# Patient Record
Sex: MALE | Race: BLACK OR AFRICAN AMERICAN | NOT HISPANIC OR LATINO
[De-identification: names, ages, dates, MRNs, and addresses within clinical notes are randomized per-mention and may not be internally consistent; named-entity substitution may affect disease eponyms.]

---
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
## 2023-01-17 ENCOUNTER — RX RENEWAL (OUTPATIENT)
Age: 18
End: 2023-01-17

## 2023-02-03 ENCOUNTER — RX RENEWAL (OUTPATIENT)
Age: 18
End: 2023-02-03

## 2023-03-06 ENCOUNTER — RX RENEWAL (OUTPATIENT)
Age: 18
End: 2023-03-06

## 2023-04-06 ENCOUNTER — OUTPATIENT (OUTPATIENT)
Dept: OUTPATIENT SERVICES | Facility: HOSPITAL | Age: 18
LOS: 1 days | End: 2023-04-06
Payer: MEDICAID

## 2023-04-06 ENCOUNTER — APPOINTMENT (OUTPATIENT)
Dept: NEUROLOGY | Facility: HOSPITAL | Age: 18
End: 2023-04-06
Payer: MEDICAID

## 2023-04-06 VITALS
RESPIRATION RATE: 14 BRPM | WEIGHT: 210 LBS | SYSTOLIC BLOOD PRESSURE: 127 MMHG | HEIGHT: 69 IN | BODY MASS INDEX: 31.1 KG/M2 | TEMPERATURE: 98 F | HEART RATE: 76 BPM | DIASTOLIC BLOOD PRESSURE: 87 MMHG

## 2023-04-06 DIAGNOSIS — R56.9 UNSPECIFIED CONVULSIONS: ICD-10-CM

## 2023-04-06 DIAGNOSIS — G40.909 EPILEPSY, UNSPECIFIED, NOT INTRACTABLE, WITHOUT STATUS EPILEPTICUS: ICD-10-CM

## 2023-04-06 PROCEDURE — G0463: CPT

## 2023-04-06 PROCEDURE — 99212 OFFICE O/P EST SF 10 MIN: CPT

## 2023-04-06 NOTE — HISTORY OF PRESENT ILLNESS
[FreeTextEntry1] : Current Medications:\par LAC 200mg BID\par VPA 750mg BID (Level of 57 in 3/22)\par XCopri 200mg QD\par \par Follow-up 4/6/23\par Patient returns for follow-up accompanied by grandmother. Has been doing well. Noted that at times if the patient doesn't want to do something, he may stamp his foot but grandmother denied that the patient has had any agitation, anger. Has been doing well in school and is noted to be loved by his classmates. Has been sleeping and eating well. No rashes, skin changes. No falls, instability. Occasionally has had mild tremors when performing fine finger movements such as zipping up his coat but is still able to perform the task. No missed doses. No seizures since 2021. \par \par Follow-up 11/17/22\par Patient returns for follow-up accompanied by his mother. Mother stated that the patient has been doing well. Has been doing well, no mood changes, agitation, other behavioral changes, lethargy, dizziness, gait imbalance, sleep changes. Has been sleeping and eating well. No rashes or other skin changes. Goes to school and has been doing well as per mother. Takes medications as prescribed without missed doses, no reported side effects. No seizures in several years. \par \par **6/30/22**\par Here for follow up. no seizures since the last visit.  Goes to school, doing well from behavioral perspective. No dizziness, gait imbalance. Overall doing well.\par \par Follow up visit on 4/7/22\par \par 16 yo male born FT, nonverbal, with history of focal epilepsy (diagnosed at 5 months of age) and intellectual disability secondary to KCNA2 mutation who presents for follow up regarding focal epilepsy. Patient recently underwent EMU stay in 3/22 that demonstrated BL frontotemporal sharps on EEG. Patient ultimately transitioned from OXC to LAC and began Xcopri titration pack. At present patient is on his last dose of 150mg Xcopri and will be increasing to 200mg qd. Patient has not suffered any seizures since his EMU stay and appears to be tolerating his medications well. Today he appears to be in good spirits. Patients grandmother does not appear to have noticed any signs concerning for somnolence, dizziness, falls, newfound gait instability, tremor.\par \par Seizure semiology: eyes twitching and behavioral arrest (CLARA) with or without generalized tonic clonic episode. Also noted to have stiffening episodes in the past. Was seizure free from ages 5 - 15 and then had seizure in June 2020 that resulted in resuming VPA. \par \par Social Hx: \par In 12th grade at [address] [place] [address], currently in remote schooling. (Previously 12:1 class with para). OT/PT/ST at home (2x/2x/3x) now. Will continue with the same high school until 19 years of age, with plans to go to day program afterwards (unsure of independence for a job, but enjoys meeting people and being social). \par \par Seizure History: \par REEG/AEEG ordered last visit- not done; last vEEG showed bilateral occipital spikes with left sided electrographic sz. \par MRI in 2018 attempted but discontinued due to coughing, attributed to reaction to medication (unclear if related to sedation or IV contrast). 11/2007 MRI was unremarkable\par Last levels- August 2019, normal\par \par AED History: \par Phenobarbital - weaned off 2/2016\par VPA- Weaned him off in Nov-December 2019, resumed June 2020.

## 2023-04-06 NOTE — ASSESSMENT
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 Last seizure June 2020, no recurrence since, recently underwent EMU stay in 3/2022 that ultimately transitioned from OXC to LAC and began Xcopri titration pack, now on xcopri 200 mg. Neurologic examination stable. Seizures are stable\par \par Tolerating ASMs well, family denies patient is having side effects.\par \par Plan\par - Continue Xcopri 200mg qd, VPA 750mg BID and LAC 200mg BID\par - If patient develops side effects or longstanding sz control over time, will consider titrating down on LAC in the future as would have sodium channel coverage from Xcopri.\par - Check EKG at next visit, most recent EKG in Mar '22, , QTc 412\par - last VPA level 77 in Nov '22, normal platelets and LFTs\par - continue Vit D supplementation\par - Discussed basic seizure precautions\par - RTC in 6 months\par \par Patient discussed with neurology attending, Dr. Burkett.

## 2023-05-30 ENCOUNTER — RX RENEWAL (OUTPATIENT)
Age: 18
End: 2023-05-30

## 2023-05-31 ENCOUNTER — RX RENEWAL (OUTPATIENT)
Age: 18
End: 2023-05-31

## 2023-06-16 ENCOUNTER — RX RENEWAL (OUTPATIENT)
Age: 18
End: 2023-06-16

## 2023-07-31 ENCOUNTER — RX RENEWAL (OUTPATIENT)
Age: 18
End: 2023-07-31

## 2023-08-17 ENCOUNTER — OUTPATIENT (OUTPATIENT)
Dept: OUTPATIENT SERVICES | Facility: HOSPITAL | Age: 18
LOS: 1 days | End: 2023-08-17
Payer: MEDICAID

## 2023-08-17 ENCOUNTER — APPOINTMENT (OUTPATIENT)
Dept: NEUROLOGY | Facility: HOSPITAL | Age: 18
End: 2023-08-17
Payer: MEDICAID

## 2023-08-17 VITALS
DIASTOLIC BLOOD PRESSURE: 76 MMHG | HEART RATE: 84 BPM | SYSTOLIC BLOOD PRESSURE: 129 MMHG | HEIGHT: 69 IN | TEMPERATURE: 98 F | BODY MASS INDEX: 31.25 KG/M2 | OXYGEN SATURATION: 98 % | RESPIRATION RATE: 14 BRPM | WEIGHT: 211 LBS

## 2023-08-17 DIAGNOSIS — F79 UNSPECIFIED INTELLECTUAL DISABILITIES: ICD-10-CM

## 2023-08-17 DIAGNOSIS — G40.909 EPILEPSY, UNSPECIFIED, NOT INTRACTABLE, W/OUT STATUS EPILEPTICUS: ICD-10-CM

## 2023-08-17 DIAGNOSIS — R89.8 OTHER ABNORMAL FINDINGS IN SPECIMENS FROM OTHER ORGANS, SYSTEMS AND TISSUES: ICD-10-CM

## 2023-08-17 DIAGNOSIS — R56.9 UNSPECIFIED CONVULSIONS: ICD-10-CM

## 2023-08-17 LAB
24R-OH-CALCIDIOL SERPL-MCNC: 37.4 NG/ML — SIGNIFICANT CHANGE UP (ref 30–80)
ALBUMIN SERPL ELPH-MCNC: 5.1 G/DL — HIGH (ref 3.3–5)
ALP SERPL-CCNC: 87 U/L — SIGNIFICANT CHANGE UP (ref 40–120)
ALT FLD-CCNC: 38 U/L — SIGNIFICANT CHANGE UP (ref 10–45)
ANION GAP SERPL CALC-SCNC: 14 MMOL/L — SIGNIFICANT CHANGE UP (ref 5–17)
AST SERPL-CCNC: 20 U/L — SIGNIFICANT CHANGE UP (ref 10–40)
BILIRUB SERPL-MCNC: 0.2 MG/DL — SIGNIFICANT CHANGE UP (ref 0.2–1.2)
BUN SERPL-MCNC: 16 MG/DL — SIGNIFICANT CHANGE UP (ref 7–23)
CALCIUM SERPL-MCNC: 10 MG/DL — SIGNIFICANT CHANGE UP (ref 8.4–10.5)
CHLORIDE SERPL-SCNC: 102 MMOL/L — SIGNIFICANT CHANGE UP (ref 96–108)
CO2 SERPL-SCNC: 25 MMOL/L — SIGNIFICANT CHANGE UP (ref 22–31)
CREAT SERPL-MCNC: 0.82 MG/DL — SIGNIFICANT CHANGE UP (ref 0.5–1.3)
EGFR: 129 ML/MIN/1.73M2 — SIGNIFICANT CHANGE UP
GLUCOSE SERPL-MCNC: 88 MG/DL — SIGNIFICANT CHANGE UP (ref 70–99)
POTASSIUM SERPL-MCNC: 4.3 MMOL/L — SIGNIFICANT CHANGE UP (ref 3.5–5.3)
POTASSIUM SERPL-SCNC: 4.3 MMOL/L — SIGNIFICANT CHANGE UP (ref 3.5–5.3)
PROT SERPL-MCNC: 8.3 G/DL — SIGNIFICANT CHANGE UP (ref 6–8.3)
SODIUM SERPL-SCNC: 141 MMOL/L — SIGNIFICANT CHANGE UP (ref 135–145)
VALPROATE SERPL-MCNC: 69 UG/ML — SIGNIFICANT CHANGE UP (ref 50–100)

## 2023-08-17 PROCEDURE — 36415 COLL VENOUS BLD VENIPUNCTURE: CPT

## 2023-08-17 PROCEDURE — G0463: CPT

## 2023-08-17 PROCEDURE — 99214 OFFICE O/P EST MOD 30 MIN: CPT

## 2023-08-17 PROCEDURE — 82306 VITAMIN D 25 HYDROXY: CPT

## 2023-08-17 PROCEDURE — 80164 ASSAY DIPROPYLACETIC ACD TOT: CPT

## 2023-08-17 PROCEDURE — 85025 COMPLETE CBC W/AUTO DIFF WBC: CPT

## 2023-08-17 PROCEDURE — 80053 COMPREHEN METABOLIC PANEL: CPT

## 2023-08-17 NOTE — DISCUSSION/SUMMARY
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 Last seizure June 2020, no recurrence since, recently underwent EMU stay in 3/2022 that ultimately transitioned from OXC to LAC and began Xcopri titration pack, now on xcopri 200 mg. Neurologic examination stable. Seizures are stable.  Tolerating ASMs well, family denies patient is having side effects. Plan - Continue Xcopri 200mg qd, VPA 750mg BID and LAC 200mg BID - Refill of clonazepam 0.1mg BID PRN  - If patient develops side effects or longstanding sz control over time, will consider titrating down on LAC in the future as would have sodium channel coverage from Xcopri. - Check EKG, most recent EKG in Mar '22, , QTc 412 - last VPA level 77 in Nov '22, normal platelets and LFTs - Check VPA  - Discussed basic seizure precautions - RTC in 6 months  Patient discussed with neurology fellow Dr. Gwen Jeronimo under supervision of Dr. Radha Wu

## 2023-08-17 NOTE — HISTORY OF PRESENT ILLNESS
[FreeTextEntry1] : Current Medications: LAC 200mg BID VPA 750mg BID (Level of 77 in 4/22) XCopri 200mg QD  8/17/2023 Patient returns for follow up accompanied by mother. Has been doing well. having fun at school. Mother noted that son would stamp his foot when he does not want to do anything but would do what his mother says 10 minutes later. No seizures in 2023. No missed doses.   Follow-up 4/6/23  Patient returns for follow-up accompanied by grandmother. Has been doing well. Noted that at times if the patient doesn't want to do something, he may stamp his foot but grandmother denied that the patient has had any agitation, anger. Has been doing well in school and is noted to be loved by his classmates. Has been sleeping and eating well. No rashes, skin changes. No falls, instability. Occasionally has had mild tremors when performing fine finger movements such as zipping up his coat but is still able to perform the task. No missed doses. No seizures since 2021.    Follow-up 11/17/22  Patient returns for follow-up accompanied by his mother. Mother stated that the patient has been doing well. Has been doing well, no mood changes, agitation, other behavioral changes, lethargy, dizziness, gait imbalance, sleep changes. Has been sleeping and eating well. No rashes or other skin changes. Goes to school and has been doing well as per mother. Takes medications as prescribed without missed doses, no reported side effects. No seizures in several years.    **6/30/22**  Here for follow up. no seizures since the last visit. Goes to school, doing well from behavioral perspective. No dizziness, gait imbalance. Overall doing well.    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 Patient recently underwent EMU stay in 3/22 that demonstrated BL frontotemporal sharps on EEG. Patient ultimately transitioned from OXC to LAC and began Xcopri titration pack. At present patient is on his last dose of 150mg Xcopri and will be increasing to 200mg qd. Patient has not suffered any seizures since his EMU stay and appears to be tolerating his medications well. Today he appears to be in good spirits. Patients grandmother does not appear to have noticed any signs concerning for somnolence, dizziness, falls, newfound gait instability, tremor.    Seizure semiology: eyes twitching and behavioral arrest (CLARA) with or without generalized tonic clonic episode. Also noted to have stiffening episodes in the past. Was seizure free from ages 5 - 15 and then had seizure in June 2020 that resulted in resuming VPA.    Social Hx:  In 12th grade at [address] [place] [address] school, currently in remote schooling. (Previously 12:1 class with para). OT/PT/ST at home (2x/2x/3x) now. Will continue with the same high school until 19 years of age, with plans to go to day program afterwards (unsure of independence for a job, but enjoys meeting people and being social).    Seizure History:  REEG/AEEG ordered last visit- not done; last vEEG showed bilateral occipital spikes with left sided electrographic sz.  MRI in 2018 attempted but discontinued due to coughing, attributed to reaction to medication (unclear if related to sedation or IV contrast). 11/2007 MRI was unremarkable  Last levels- August 2019, normal    AED History:  Phenobarbital - weaned off 2/2016  VPA- Weaned him off in Nov-December 2019, resumed June 2020.

## 2023-08-17 NOTE — PHYSICAL EXAM
[At Term] : at term [ Section] : by  section [FreeTextEntry1] : Patient non verbal but makes eye contact and intermittently follows commands EOMI, PERRL no facial asymmetry, head turn and shoulder shrug intact Motor: spontaneously moving all four extremities w/o laterality Sensation: localizes to LT Coordination unable to test DTR 2+ throughout Gait: slightly wide based gait. [None] : there were no delivery complications [de-identified] : scheduled  [FreeTextEntry1] : 7 lbs 12 oz

## 2023-08-18 LAB
BASOPHILS # BLD AUTO: 0.04 K/UL — SIGNIFICANT CHANGE UP (ref 0–0.2)
BASOPHILS NFR BLD AUTO: 0.5 % — SIGNIFICANT CHANGE UP (ref 0–2)
EOSINOPHIL # BLD AUTO: 0.08 K/UL — SIGNIFICANT CHANGE UP (ref 0–0.5)
EOSINOPHIL NFR BLD AUTO: 0.9 % — SIGNIFICANT CHANGE UP (ref 0–6)
HCT VFR BLD CALC: 42.7 % — SIGNIFICANT CHANGE UP (ref 39–50)
HGB BLD-MCNC: 13.5 G/DL — SIGNIFICANT CHANGE UP (ref 13–17)
IMM GRANULOCYTES NFR BLD AUTO: 0.1 % — SIGNIFICANT CHANGE UP (ref 0–0.9)
LYMPHOCYTES # BLD AUTO: 3.78 K/UL — HIGH (ref 1–3.3)
LYMPHOCYTES # BLD AUTO: 42.8 % — SIGNIFICANT CHANGE UP (ref 13–44)
MCHC RBC-ENTMCNC: 25.6 PG — LOW (ref 27–34)
MCHC RBC-ENTMCNC: 31.6 GM/DL — LOW (ref 32–36)
MCV RBC AUTO: 80.9 FL — SIGNIFICANT CHANGE UP (ref 80–100)
MONOCYTES # BLD AUTO: 0.73 K/UL — SIGNIFICANT CHANGE UP (ref 0–0.9)
MONOCYTES NFR BLD AUTO: 8.3 % — SIGNIFICANT CHANGE UP (ref 2–14)
NEUTROPHILS # BLD AUTO: 4.19 K/UL — SIGNIFICANT CHANGE UP (ref 1.8–7.4)
NEUTROPHILS NFR BLD AUTO: 47.4 % — SIGNIFICANT CHANGE UP (ref 43–77)
PLATELET # BLD AUTO: 311 K/UL — SIGNIFICANT CHANGE UP (ref 150–400)
RBC # BLD: 5.28 M/UL — SIGNIFICANT CHANGE UP (ref 4.2–5.8)
RBC # FLD: 14 % — SIGNIFICANT CHANGE UP (ref 10.3–14.5)
WBC # BLD: 8.83 K/UL — SIGNIFICANT CHANGE UP (ref 3.8–10.5)
WBC # FLD AUTO: 8.83 K/UL — SIGNIFICANT CHANGE UP (ref 3.8–10.5)

## 2023-08-21 PROBLEM — F79 INTELLECTUAL DISABILITY: Status: ACTIVE | Noted: 2017-12-02

## 2023-08-21 PROBLEM — R89.8 ABNORMAL GENETIC TEST: Status: ACTIVE | Noted: 2018-01-25

## 2023-08-23 DIAGNOSIS — R89.8 OTHER ABNORMAL FINDINGS IN SPECIMENS FROM OTHER ORGANS, SYSTEMS AND TISSUES: ICD-10-CM

## 2023-08-23 DIAGNOSIS — F79 UNSPECIFIED INTELLECTUAL DISABILITIES: ICD-10-CM

## 2023-08-23 DIAGNOSIS — G40.909 EPILEPSY, UNSPECIFIED, NOT INTRACTABLE, WITHOUT STATUS EPILEPTICUS: ICD-10-CM

## 2023-08-24 ENCOUNTER — NON-APPOINTMENT (OUTPATIENT)
Age: 18
End: 2023-08-24

## 2023-11-06 NOTE — PATIENT PROFILE ADULT - FUNCTIONAL ASSESSMENT - BASIC MOBILITY ASSESSMENT TYPE
Spoke with Raymundo with poison control, Raymundo states she has no concerns regarding poising related to possible ingestion of [employer] Flee and [employer] [place]. Raymundo states if the patient ingested any amount of this chemical, that patient would has exhibited GI upset sooner than 5+ hours post exposure. MD made aware of no further recommendations from poison control.    Admission

## 2023-12-08 ENCOUNTER — RX RENEWAL (OUTPATIENT)
Age: 18
End: 2023-12-08

## 2023-12-29 ENCOUNTER — RX RENEWAL (OUTPATIENT)
Age: 18
End: 2023-12-29

## 2024-01-29 ENCOUNTER — RX RENEWAL (OUTPATIENT)
Age: 19
End: 2024-01-29

## 2024-01-29 RX ORDER — LACOSAMIDE 200 MG/1
200 TABLET ORAL
Qty: 180 | Refills: 1 | Status: ACTIVE | COMMUNITY
Start: 2022-04-07 | End: 1900-01-01

## 2024-02-15 ENCOUNTER — APPOINTMENT (OUTPATIENT)
Dept: NEUROLOGY | Facility: HOSPITAL | Age: 19
End: 2024-02-15

## 2024-05-07 ENCOUNTER — RX RENEWAL (OUTPATIENT)
Age: 19
End: 2024-05-07

## 2024-06-04 ENCOUNTER — RX RENEWAL (OUTPATIENT)
Age: 19
End: 2024-06-04

## 2024-06-04 RX ORDER — CENOBAMATE 200 MG/1
200 TABLET, FILM COATED ORAL DAILY
Qty: 30 | Refills: 5 | Status: ACTIVE | COMMUNITY
Start: 2022-04-19 | End: 1900-01-01

## 2024-07-26 ENCOUNTER — RX RENEWAL (OUTPATIENT)
Age: 19
End: 2024-07-26

## 2024-08-15 RX ORDER — PHENYTOIN SODIUM 300 MG/1
300 CAPSULE, EXTENDED RELEASE ORAL DAILY
Qty: 30 | Refills: 0 | Status: ACTIVE | COMMUNITY
Start: 2024-08-15 | End: 1900-01-01

## 2024-08-22 ENCOUNTER — NON-APPOINTMENT (OUTPATIENT)
Age: 19
End: 2024-08-22

## 2024-08-27 NOTE — ASU PATIENT PROFILE, PEDIATRIC - ATTEMPT TO OOB
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 No concerns, continue to monitor    Sunblock for Children:  -Look for mineral sunblock with physical blockers only (avoid chemical blockers)  -Titanium dioxide or Zinc oxide (physical blockers)  -Some sunscreen brands: Aveeno Baby, Neutrogena Baby, Blue Lizard sensitive  -Hats, sunglasses, and sun-protective clothing and swimwear   -Sun protective clothing brands:   UV Skinz:  [email or web address]  Coolibar: [email or web address].coolibar.com    
no

## 2024-09-03 ENCOUNTER — RX RENEWAL (OUTPATIENT)
Age: 19
End: 2024-09-03

## 2024-09-05 ENCOUNTER — APPOINTMENT (OUTPATIENT)
Dept: NEUROLOGY | Facility: HOSPITAL | Age: 19
End: 2024-09-05

## 2024-10-17 ENCOUNTER — OUTPATIENT (OUTPATIENT)
Dept: OUTPATIENT SERVICES | Facility: HOSPITAL | Age: 19
LOS: 1 days | End: 2024-10-17
Payer: MEDICAID

## 2024-10-17 ENCOUNTER — APPOINTMENT (OUTPATIENT)
Dept: NEUROLOGY | Facility: HOSPITAL | Age: 19
End: 2024-10-17
Payer: MEDICAID

## 2024-10-17 VITALS
DIASTOLIC BLOOD PRESSURE: 82 MMHG | SYSTOLIC BLOOD PRESSURE: 123 MMHG | OXYGEN SATURATION: 94 % | HEIGHT: 69 IN | HEART RATE: 82 BPM | WEIGHT: 204 LBS | TEMPERATURE: 96.6 F | RESPIRATION RATE: 16 BRPM | BODY MASS INDEX: 30.21 KG/M2

## 2024-10-17 DIAGNOSIS — R56.9 UNSPECIFIED CONVULSIONS: ICD-10-CM

## 2024-10-17 DIAGNOSIS — G40.909 EPILEPSY, UNSPECIFIED, NOT INTRACTABLE, W/OUT STATUS EPILEPTICUS: ICD-10-CM

## 2024-10-17 DIAGNOSIS — R89.8 OTHER ABNORMAL FINDINGS IN SPECIMENS FROM OTHER ORGANS, SYSTEMS AND TISSUES: ICD-10-CM

## 2024-10-17 DIAGNOSIS — F79 UNSPECIFIED INTELLECTUAL DISABILITIES: ICD-10-CM

## 2024-10-17 LAB
HCT VFR BLD CALC: 44.6 % — SIGNIFICANT CHANGE UP (ref 39–50)
HGB BLD-MCNC: 13.7 G/DL — SIGNIFICANT CHANGE UP (ref 13–17)
MCHC RBC-ENTMCNC: 25.7 PG — LOW (ref 27–34)
MCHC RBC-ENTMCNC: 30.7 GM/DL — LOW (ref 32–36)
MCV RBC AUTO: 83.7 FL — SIGNIFICANT CHANGE UP (ref 80–100)
PLATELET # BLD AUTO: 119 K/UL — LOW (ref 150–400)
RBC # BLD: 5.33 M/UL — SIGNIFICANT CHANGE UP (ref 4.2–5.8)
RBC # FLD: 13.8 % — SIGNIFICANT CHANGE UP (ref 10.3–14.5)
WBC # BLD: 7.93 K/UL — SIGNIFICANT CHANGE UP (ref 3.8–10.5)
WBC # FLD AUTO: 7.93 K/UL — SIGNIFICANT CHANGE UP (ref 3.8–10.5)

## 2024-10-17 PROCEDURE — 85027 COMPLETE CBC AUTOMATED: CPT

## 2024-10-17 PROCEDURE — 80164 ASSAY DIPROPYLACETIC ACD TOT: CPT

## 2024-10-17 PROCEDURE — 80053 COMPREHEN METABOLIC PANEL: CPT

## 2024-10-17 PROCEDURE — 99214 OFFICE O/P EST MOD 30 MIN: CPT

## 2024-10-17 PROCEDURE — 36415 COLL VENOUS BLD VENIPUNCTURE: CPT

## 2024-10-17 PROCEDURE — G0463: CPT

## 2024-10-17 RX ORDER — CLONAZEPAM 1 MG/1
1 TABLET ORAL TWICE DAILY
Qty: 20 | Refills: 0 | Status: ACTIVE | COMMUNITY
Start: 2024-10-17 | End: 1900-01-01

## 2024-10-18 DIAGNOSIS — G40.909 EPILEPSY, UNSPECIFIED, NOT INTRACTABLE, WITHOUT STATUS EPILEPTICUS: ICD-10-CM

## 2024-10-18 DIAGNOSIS — F79 UNSPECIFIED INTELLECTUAL DISABILITIES: ICD-10-CM

## 2024-10-18 DIAGNOSIS — R89.8 OTHER ABNORMAL FINDINGS IN SPECIMENS FROM OTHER ORGANS, SYSTEMS AND TISSUES: ICD-10-CM

## 2024-10-18 LAB
ALBUMIN SERPL ELPH-MCNC: 4.7 G/DL — SIGNIFICANT CHANGE UP (ref 3.3–5)
ALP SERPL-CCNC: 66 U/L — SIGNIFICANT CHANGE UP (ref 40–120)
ALT FLD-CCNC: 45 U/L — SIGNIFICANT CHANGE UP (ref 10–45)
ANION GAP SERPL CALC-SCNC: 21 MMOL/L — HIGH (ref 5–17)
AST SERPL-CCNC: 31 U/L — SIGNIFICANT CHANGE UP (ref 10–40)
BILIRUB SERPL-MCNC: 0.2 MG/DL — SIGNIFICANT CHANGE UP (ref 0.2–1.2)
BUN SERPL-MCNC: 10 MG/DL — SIGNIFICANT CHANGE UP (ref 7–23)
CALCIUM SERPL-MCNC: 10.5 MG/DL — SIGNIFICANT CHANGE UP (ref 8.4–10.5)
CHLORIDE SERPL-SCNC: 100 MMOL/L — SIGNIFICANT CHANGE UP (ref 96–108)
CO2 SERPL-SCNC: 19 MMOL/L — LOW (ref 22–31)
CREAT SERPL-MCNC: 0.54 MG/DL — SIGNIFICANT CHANGE UP (ref 0.5–1.3)
EGFR: 145 ML/MIN/1.73M2 — SIGNIFICANT CHANGE UP
GLUCOSE SERPL-MCNC: 109 MG/DL — HIGH (ref 70–99)
POTASSIUM SERPL-MCNC: 5 MMOL/L — SIGNIFICANT CHANGE UP (ref 3.5–5.3)
POTASSIUM SERPL-SCNC: 5 MMOL/L — SIGNIFICANT CHANGE UP (ref 3.5–5.3)
PROT SERPL-MCNC: 7.9 G/DL — SIGNIFICANT CHANGE UP (ref 6–8.3)
SODIUM SERPL-SCNC: 140 MMOL/L — SIGNIFICANT CHANGE UP (ref 135–145)
VALPROATE SERPL-MCNC: 70 UG/ML — SIGNIFICANT CHANGE UP (ref 50–100)

## 2024-10-24 ENCOUNTER — NON-APPOINTMENT (OUTPATIENT)
Age: 19
End: 2024-10-24

## 2024-10-30 NOTE — PATIENT PROFILE ADULT - NSPROMEDSBROUGHTTOHOSP_GEN_A_NUR
10/30/24 11:13 AM     Chart reviewed for CRC: Colonoscopy was/were not submitted to the patient's insurance.     Yumi Michele MA   [facility]  
no

## 2024-12-03 RX ORDER — CENOBAMATE 200 MG/1
200 TABLET, FILM COATED ORAL DAILY
Qty: 30 | Refills: 5 | Status: ACTIVE | COMMUNITY
Start: 2024-12-03 | End: 1900-01-01

## 2025-01-03 ENCOUNTER — TRANSCRIPTION ENCOUNTER (OUTPATIENT)
Age: 20
End: 2025-01-03

## 2025-01-03 ENCOUNTER — RX RENEWAL (OUTPATIENT)
Age: 20
End: 2025-01-03

## 2025-04-28 ENCOUNTER — RX RENEWAL (OUTPATIENT)
Age: 20
End: 2025-04-28

## 2025-05-27 ENCOUNTER — RX RENEWAL (OUTPATIENT)
Age: 20
End: 2025-05-27

## 2025-06-30 ENCOUNTER — RX RENEWAL (OUTPATIENT)
Age: 20
End: 2025-06-30